# Patient Record
Sex: FEMALE | URBAN - METROPOLITAN AREA
[De-identification: names, ages, dates, MRNs, and addresses within clinical notes are randomized per-mention and may not be internally consistent; named-entity substitution may affect disease eponyms.]

---

## 2018-12-21 ENCOUNTER — APPOINTMENT (RX ONLY)
Dept: URBAN - METROPOLITAN AREA CLINIC 347 | Facility: CLINIC | Age: 33
Setting detail: DERMATOLOGY
End: 2018-12-21

## 2018-12-21 DIAGNOSIS — L50.3 DERMATOGRAPHIC URTICARIA: ICD-10-CM

## 2018-12-21 PROBLEM — L70.0 ACNE VULGARIS: Status: ACTIVE | Noted: 2018-12-21

## 2018-12-21 PROCEDURE — ? TREATMENT REGIMEN

## 2018-12-21 PROCEDURE — ? PRESCRIPTION

## 2018-12-21 PROCEDURE — 99202 OFFICE O/P NEW SF 15 MIN: CPT

## 2018-12-21 PROCEDURE — ? COUNSELING

## 2018-12-21 RX ORDER — TRIAMCINOLONE ACETONIDE 1 MG/G
CREAM TOPICAL
Qty: 1 | Refills: 0 | Status: ERX

## 2018-12-21 ASSESSMENT — LOCATION SIMPLE DESCRIPTION DERM
LOCATION SIMPLE: LEFT THIGH
LOCATION SIMPLE: CHEST
LOCATION SIMPLE: LEFT FOREARM
LOCATION SIMPLE: RIGHT UPPER BACK
LOCATION SIMPLE: LEFT POSTERIOR THIGH

## 2018-12-21 ASSESSMENT — LOCATION DETAILED DESCRIPTION DERM
LOCATION DETAILED: LEFT VENTRAL PROXIMAL FOREARM
LOCATION DETAILED: MIDDLE STERNUM
LOCATION DETAILED: LEFT ANTERIOR PROXIMAL THIGH
LOCATION DETAILED: LEFT DISTAL POSTERIOR THIGH
LOCATION DETAILED: RIGHT INFERIOR MEDIAL UPPER BACK

## 2018-12-21 ASSESSMENT — LOCATION ZONE DERM
LOCATION ZONE: TRUNK
LOCATION ZONE: LEG
LOCATION ZONE: ARM

## 2018-12-21 NOTE — PROCEDURE: TREATMENT REGIMEN
Detail Level: Zone
Otc Regimen: Allegra or Claritin \\nZantac or ranitidine\\nFree and clear detergents\\nNo fabric softeners\\nFree and clear dryer sheets\\nDove, Aveeno, CeraVe, or Eucerin body wash and moisturizer\\nNo suave or VO5 shampoo
Initiate Treatment: Triamcinolone 0.1% cream-apply to itchy areas as needed for itch
Plan: Will consider patch testing if not better

## 2018-12-27 RX ORDER — TRIAMCINOLONE ACETONIDE 1 MG/G
CREAM TOPICAL
Qty: 1 | Refills: 0 | Status: ERX

## 2019-07-03 ENCOUNTER — APPOINTMENT (RX ONLY)
Dept: URBAN - METROPOLITAN AREA CLINIC 347 | Facility: CLINIC | Age: 34
Setting detail: DERMATOLOGY
End: 2019-07-03

## 2019-07-03 DIAGNOSIS — D18.0 HEMANGIOMA: ICD-10-CM

## 2019-07-03 DIAGNOSIS — L81.4 OTHER MELANIN HYPERPIGMENTATION: ICD-10-CM

## 2019-07-03 PROBLEM — D18.01 HEMANGIOMA OF SKIN AND SUBCUTANEOUS TISSUE: Status: ACTIVE | Noted: 2019-07-03

## 2019-07-03 PROBLEM — L29.8 OTHER PRURITUS: Status: ACTIVE | Noted: 2019-07-03

## 2019-07-03 PROCEDURE — 99213 OFFICE O/P EST LOW 20 MIN: CPT

## 2019-07-03 PROCEDURE — ? COUNSELING

## 2019-07-03 ASSESSMENT — LOCATION SIMPLE DESCRIPTION DERM
LOCATION SIMPLE: SUPERIOR FOREHEAD
LOCATION SIMPLE: RIGHT FOREHEAD

## 2019-07-03 ASSESSMENT — LOCATION ZONE DERM: LOCATION ZONE: FACE

## 2019-07-03 ASSESSMENT — LOCATION DETAILED DESCRIPTION DERM
LOCATION DETAILED: SUPERIOR MID FOREHEAD
LOCATION DETAILED: RIGHT SUPERIOR MEDIAL FOREHEAD

## 2019-10-21 ENCOUNTER — APPOINTMENT (RX ONLY)
Dept: URBAN - METROPOLITAN AREA CLINIC 347 | Facility: CLINIC | Age: 34
Setting detail: DERMATOLOGY
End: 2019-10-21

## 2019-10-21 DIAGNOSIS — L30.9 DERMATITIS, UNSPECIFIED: ICD-10-CM

## 2019-10-21 PROBLEM — L85.3 XEROSIS CUTIS: Status: ACTIVE | Noted: 2019-10-21

## 2019-10-21 PROCEDURE — ? COUNSELING

## 2019-10-21 PROCEDURE — 95044 PATCH/APPLICATION TESTS: CPT

## 2019-10-21 PROCEDURE — ? PATCH TESTING

## 2019-10-23 ENCOUNTER — APPOINTMENT (RX ONLY)
Dept: URBAN - METROPOLITAN AREA CLINIC 347 | Facility: CLINIC | Age: 34
Setting detail: DERMATOLOGY
End: 2019-10-23

## 2019-10-23 DIAGNOSIS — L30.9 DERMATITIS, UNSPECIFIED: ICD-10-CM | Status: RESOLVING

## 2019-10-23 PROCEDURE — 99213 OFFICE O/P EST LOW 20 MIN: CPT

## 2019-10-23 PROCEDURE — ? OTHER

## 2019-10-23 PROCEDURE — ? CORE ACDS PATCH TEST READING

## 2019-10-23 PROCEDURE — ? COUNSELING

## 2019-10-23 ASSESSMENT — LOCATION ZONE DERM: LOCATION ZONE: TRUNK

## 2019-10-23 ASSESSMENT — LOCATION SIMPLE DESCRIPTION DERM
LOCATION SIMPLE: RIGHT LOWER BACK
LOCATION SIMPLE: LEFT UPPER BACK

## 2019-10-23 ASSESSMENT — LOCATION DETAILED DESCRIPTION DERM
LOCATION DETAILED: RIGHT SUPERIOR MEDIAL MIDBACK
LOCATION DETAILED: LEFT SUPERIOR LATERAL UPPER BACK

## 2019-10-23 NOTE — PROCEDURE: OTHER
Other (Free Text): Patient can follow up with allergist after testing to see if there is an internal allergy to season change, pets etc.
Detail Level: Zone
Note Text (......Xxx Chief Complaint.): This diagnosis correlates with the

## 2019-10-23 NOTE — PROCEDURE: MIPS QUALITY
Quality 110: Preventive Care And Screening: Influenza Immunization: Influenza immunization was not ordered or administered, reason not given
Quality 130: Documentation Of Current Medications In The Medical Record: Current Medications with Name, Dosage, Frequency, or Route not Documented, Reason not Given
Detail Level: Detailed
Quality 226: Preventive Care And Screening: Tobacco Use: Screening And Cessation Intervention: Patient screened for tobacco use and is an ex/non-smoker

## 2019-10-23 NOTE — PROCEDURE: CORE ACDS PATCH TEST READING
Allergen 65 Reaction: no reaction
Name Of Allergen 34: fragrance mix II
Name Of Allergen 31: hydrocortisone-17-butyrate
Name Of Allergen 3: neomycin sulfate
Name Of Allergen 73: amidoamine
Name Of Allergen 8: paraben mix (B)
Name Of Allergen 46: methyl methacrylate
Name Of Allergen 40: cocamidopropyl betaine
Name Of Allergen 16: black rubber mix
Name Of Allergen 33: bacitracin
Name Of Allergen 1: nickel sulfate hexahydrate
Name Of Allergen 29: imidazolidinyl urea
Name Of Allergen 19: methyldibromo glutaronitrile
Name Of Allergen 62: sodium benzoate
Name Of Allergen 39: polymyxin B sulfate
Name Of Allergen 57: sesquiterpenelactone mix
Name Of Allergen 72: clobetasol-17-propionate
Show Allergen Counseling In The Note?: Yes
Name Of Allergen 26: benzocaine
Name Of Allergen 14: bisphenol A epoxy resin
Name Of Allergen 65: compositae mix
Name Of Allergen 12: cobalt (II) chloride hexahydrate
Name Of Allergen 67: sorbitan sesquioleate
Name Of Allergen 30: budesonide
Name Of Allergen 18: quaternium 15
Name Of Allergen 55: 2-hydroxy-4-methoxybenzophenone
Name Of Allergen 49: dl alpha tocopherol
Name Of Allergen 2: amerchol L101
Detail Level: Detailed
Name Of Allergen 61: benzophenone-4
Name Of Allergen 38: iodopropynyl butylcarbamate
Name Of Allergen 32: 2-mercaptobenzothiazole
Name Of Allergen 4: potassium dichromate
Name Of Allergen 68: 1,3-diphenylguanidine
What Reading Time Point?: 48 hour
Name Of Allergen 52: chlorhexidine digluconate
Name Of Allergen 45: decyl glucoside
Name Of Allergen 21: formaldehyde
Name Of Allergen 78: butylhydroxytoluene
Name Of Allergen 10: balsam of peru
Name Of Allergen 23: 2-bromo-2-nitropropane-1,3-diol
Name Of Allergen 13: [id number]-[id number] formaldehyde resin
Name Of Allergen 76: disperse orange-3
Name Of Allergen 66: ethyleneurea, melamine formaldehyde mix
Name Of Allergen 24: thiuram mix
Name Of Allergen 70: ethyl hexyl glycerol
Name Of Allergen 6: fragrance mix
Name Of Allergen 36: lidocaine-Hcl
Name Of Allergen 77: benzoic acid
Name Of Allergen 44: oleamidopropyl dimethylamine
Name Of Allergen 35: disperse blue mix
Name Of Allergen 74: ethyl cyanoacrylate
Name Of Allergen 69: cetylstearylalcohol
Name Of Allergen 56: tosylamide formaldehyde resin
Name Of Allergen 41: mixed dialkyl thioureas
Name Of Allergen 42: dimethylaminopropylamine
Name Of Allergen 5: DMDM hydantoin
Name Of Allergen 17: methylchloroisothiazolinone/methylisothiazolinone
Name Of Allergen 9: methylisothiazolinone
Name Of Allergen 75: phenoxyethanol
Name Of Allergen 59: 4-chloro-3-cresol
Name Of Allergen 64: cananga odorata
Name Of Allergen 51: tea tree oil, oxidized
Name Of Allergen 53: propolis
Name Of Allergen 79: 2-ethylhexyl-4-methoxycinnamate
Name Of Allergen 80: benzyl alcohol
Name Of Allergen 58: coconut diethanlamide
Name Of Allergen 37: propylene glycol
Name Of Allergen 63: sorbic acid
Name Of Allergen 47: lavadula angustifolia oil (lavender oil)
Name Of Allergen 28: gold sodium thiosulfate
Name Of Allergen 71: triamcinolone acetonide
Name Of Allergen 11: ethylenediamine
Name Of Allergen 48: cinnamic aldehyde
Number Of Patches Read: 80
Name Of Allergen 22: mercapto mix (A)
Name Of Allergen 20: p-phenylenediamine
Name Of Allergen 50: ethyl acrylate
Name Of Allergen 43: 2-hydroxyethyl methacrylate
Name Of Allergen 25: diazolidinyl urea
Name Of Allergen 60: benzalkonium chloride
Name Of Allergen 7: colophony
Name Of Allergen 27: tixocortol-21-pivalate
Name Of Allergen 15: carba mix
Name Of Allergen 54: 4-chloro-3,5-xylenol

## 2019-10-25 ENCOUNTER — APPOINTMENT (RX ONLY)
Dept: URBAN - METROPOLITAN AREA CLINIC 347 | Facility: CLINIC | Age: 34
Setting detail: DERMATOLOGY
End: 2019-10-25

## 2019-10-25 DIAGNOSIS — L30.9 DERMATITIS, UNSPECIFIED: ICD-10-CM

## 2019-10-25 DIAGNOSIS — L50.3 DERMATOGRAPHIC URTICARIA: ICD-10-CM

## 2019-10-25 PROCEDURE — ? COUNSELING

## 2019-10-25 PROCEDURE — ? OTHER

## 2019-10-25 PROCEDURE — ? TREATMENT REGIMEN

## 2019-10-25 PROCEDURE — 99213 OFFICE O/P EST LOW 20 MIN: CPT

## 2019-10-25 PROCEDURE — ? CORE ACDS PATCH TEST READING

## 2019-10-25 ASSESSMENT — LOCATION SIMPLE DESCRIPTION DERM
LOCATION SIMPLE: LEFT UPPER BACK
LOCATION SIMPLE: RIGHT LOWER BACK

## 2019-10-25 ASSESSMENT — LOCATION ZONE DERM: LOCATION ZONE: TRUNK

## 2019-10-25 NOTE — PROCEDURE: CORE ACDS PATCH TEST READING
Allergen 44 Reaction: no reaction
Name Of Allergen 2: amerchol L101
Name Of Allergen 42: dimethylaminopropylamine
Name Of Allergen 24: thiuram mix
Name Of Allergen 58: coconut diethanlamide
Name Of Allergen 40: cocamidopropyl betaine
Name Of Allergen 29: imidazolidinyl urea
Name Of Allergen 28: gold sodium thiosulfate
Name Of Allergen 48: cinnamic aldehyde
Name Of Allergen 71: triamcinolone acetonide
Name Of Allergen 15: carba mix
Name Of Allergen 44: oleamidopropyl dimethylamine
Name Of Allergen 27: tixocortol-21-pivalate
Name Of Allergen 26: benzocaine
Name Of Allergen 47: lavadula angustifolia oil (lavender oil)
Name Of Allergen 52: chlorhexidine digluconate
Name Of Allergen 80: benzyl alcohol
Name Of Allergen 45: decyl glucoside
Name Of Allergen 35: disperse blue mix
Name Of Allergen 51: tea tree oil, oxidized
Name Of Allergen 13: r-ggpa-vwpbxbypbv formaldehyde resin
Name Of Allergen 65: compositae mix
Name Of Allergen 50: ethyl acrylate
Name Of Allergen 60: benzalkonium chloride
Name Of Allergen 36: lidocaine-Hcl
Name Of Allergen 55: 2-hydroxy-4-methoxybenzophenone
Name Of Allergen 64: cananga odorata
Name Of Allergen 61: benzophenone-4
Name Of Allergen 21: formaldehyde
Name Of Allergen 19: methyldibromo glutaronitrile
Name Of Allergen 4: potassium dichromate
Name Of Allergen 11: ethylenediamine
Show Negative Results In The Note?: Yes
Name Of Allergen 12: cobalt (II) chloride hexahydrate
Name Of Allergen 9: methylisothiazolinone
Name Of Allergen 14: bisphenol A epoxy resin
Name Of Allergen 74: ethyl cyanoacrylate
Name Of Allergen 57: sesquiterpenelactone mix
Name Of Allergen 54: 4-chloro-3,5-xylenol
Name Of Allergen 10: balsam of peru
Name Of Allergen 67: sorbitan sesquioleate
Name Of Allergen 68: 1,3-diphenylguanidine
Name Of Allergen 5: DMDM hydantoin
Name Of Allergen 59: 4-chloro-3-cresol
Name Of Allergen 8: paraben mix (B)
Name Of Allergen 6: fragrance mix
Name Of Allergen 20: p-phenylenediamine
Name Of Allergen 46: methyl methacrylate
Name Of Allergen 32: 2-mercaptobenzothiazole
Name Of Allergen 33: bacitracin
Name Of Allergen 16: black rubber mix
Name Of Allergen 56: tosylamide formaldehyde resin
Name Of Allergen 41: mixed dialkyl thioureas
Name Of Allergen 72: clobetasol-17-propionate
Name Of Allergen 73: amidoamine
Name Of Allergen 39: polymyxin B sulfate
Name Of Allergen 43: 2-hydroxyethyl methacrylate
Name Of Allergen 34: fragrance mix II
Name Of Allergen 69: cetylstearylalcohol
Allergen 1 Reaction: 2+
Name Of Allergen 3: neomycin sulfate
Name Of Allergen 79: 2-ethylhexyl-4-methoxycinnamate
Name Of Allergen 25: diazolidinyl urea
Name Of Allergen 38: iodopropynyl butylcarbamate
What Reading Time Point?: 72 hour
Detail Level: Detailed
Number Of Patches Read: 80
Name Of Allergen 1: nickel sulfate hexahydrate
Name Of Allergen 37: propylene glycol
Name Of Allergen 49: dl alpha tocopherol
Name Of Allergen 7: colophony
Name Of Allergen 17: methylchloroisothiazolinone/methylisothiazolinone
Name Of Allergen 53: propolis
Name Of Allergen 70: ethyl hexyl glycerol
Name Of Allergen 18: quaternium 15
Name Of Allergen 22: mercapto mix (A)
Name Of Allergen 75: phenoxyethanol
Name Of Allergen 66: ethyleneurea, melamine formaldehyde mix
Name Of Allergen 76: disperse orange-3
Name Of Allergen 63: sorbic acid
Name Of Allergen 23: 2-bromo-2-nitropropane-1,3-diol
Name Of Allergen 31: hydrocortisone-17-butyrate
Name Of Allergen 62: sodium benzoate
Name Of Allergen 30: budesonide
Name Of Allergen 78: butylhydroxytoluene
Name Of Allergen 77: benzoic acid

## 2019-10-25 NOTE — PROCEDURE: TREATMENT REGIMEN
Otc Regimen: Eucerin Advanced Wash \\nEucerin Eczema Relief moisturizer\\nQuicker cooler showers
Detail Level: Zone
Initiate Treatment: Allegra once daily x 1month

## 2019-10-25 NOTE — PROCEDURE: OTHER
Other (Free Text): Patient can follow up with allergist after testing to see if there is an internal allergy to season change, pets etc.
Note Text (......Xxx Chief Complaint.): This diagnosis correlates with the
Detail Level: Zone

## 2022-06-02 ENCOUNTER — APPOINTMENT (RX ONLY)
Dept: URBAN - METROPOLITAN AREA CLINIC 340 | Facility: CLINIC | Age: 37
Setting detail: DERMATOLOGY
End: 2022-06-02

## 2022-06-02 DIAGNOSIS — D18.0 HEMANGIOMA: ICD-10-CM

## 2022-06-02 DIAGNOSIS — L81.4 OTHER MELANIN HYPERPIGMENTATION: ICD-10-CM

## 2022-06-02 DIAGNOSIS — L82.1 OTHER SEBORRHEIC KERATOSIS: ICD-10-CM

## 2022-06-02 DIAGNOSIS — L72.0 EPIDERMAL CYST: ICD-10-CM

## 2022-06-02 DIAGNOSIS — D22 MELANOCYTIC NEVI: ICD-10-CM

## 2022-06-02 DIAGNOSIS — B35.3 TINEA PEDIS: ICD-10-CM | Status: INADEQUATELY CONTROLLED

## 2022-06-02 PROBLEM — D18.01 HEMANGIOMA OF SKIN AND SUBCUTANEOUS TISSUE: Status: ACTIVE | Noted: 2022-06-02

## 2022-06-02 PROBLEM — D22.5 MELANOCYTIC NEVI OF TRUNK: Status: ACTIVE | Noted: 2022-06-02

## 2022-06-02 PROCEDURE — 99213 OFFICE O/P EST LOW 20 MIN: CPT | Mod: 25

## 2022-06-02 PROCEDURE — ? COUNSELING

## 2022-06-02 PROCEDURE — ? OTC TREATMENT REGIMEN

## 2022-06-02 PROCEDURE — ? PRESCRIPTION MEDICATION MANAGEMENT

## 2022-06-02 PROCEDURE — 10040 EXTRACTION: CPT

## 2022-06-02 PROCEDURE — ? PRESCRIPTION

## 2022-06-02 PROCEDURE — ? ACNE SURGERY

## 2022-06-02 RX ORDER — ECONAZOLE NITRATE 10 MG/G
CREAM TOPICAL BID
Qty: 30 | Refills: 3 | Status: ERX | COMMUNITY
Start: 2022-06-02

## 2022-06-02 RX ADMIN — ECONAZOLE NITRATE: 10 CREAM TOPICAL at 00:00

## 2022-06-02 ASSESSMENT — LOCATION SIMPLE DESCRIPTION DERM
LOCATION SIMPLE: LEFT 5TH TOE
LOCATION SIMPLE: ABDOMEN
LOCATION SIMPLE: LEFT CHEEK

## 2022-06-02 ASSESSMENT — LOCATION ZONE DERM
LOCATION ZONE: TOE
LOCATION ZONE: FACE
LOCATION ZONE: TRUNK

## 2022-06-02 ASSESSMENT — LOCATION DETAILED DESCRIPTION DERM
LOCATION DETAILED: EPIGASTRIC SKIN
LOCATION DETAILED: LEFT MEDIAL 5TH TOE
LOCATION DETAILED: LEFT CENTRAL BUCCAL CHEEK
LOCATION DETAILED: PERIUMBILICAL SKIN

## 2022-06-02 NOTE — HPI: EVALUATION OF SKIN LESION(S)
What Type Of Note Output Would You Prefer (Optional)?: Standard Output
Hpi Title: Evaluation of Skin Lesions
Additional History: Lesion on nose, chin, finger, and toe

## 2022-06-02 NOTE — PROCEDURE: PRESCRIPTION MEDICATION MANAGEMENT
Render In Strict Bullet Format?: No
Initiate Treatment: econazole 1 % topical cream - Apply twice daily to for 1 month
Detail Level: Zone

## 2022-06-02 NOTE — PROCEDURE: COUNSELING
Detail Level: Generalized
Detail Level: Detailed
Patient Specific Counseling (Will Not Stick From Patient To Patient): Patient aware milia extraction may not be covered by insurance

## 2024-08-14 ENCOUNTER — APPOINTMENT (RX ONLY)
Dept: URBAN - METROPOLITAN AREA CLINIC 340 | Facility: CLINIC | Age: 39
Setting detail: DERMATOLOGY
End: 2024-08-14

## 2024-08-14 DIAGNOSIS — D22 MELANOCYTIC NEVI: ICD-10-CM

## 2024-08-14 DIAGNOSIS — L81.4 OTHER MELANIN HYPERPIGMENTATION: ICD-10-CM

## 2024-08-14 DIAGNOSIS — L72.0 EPIDERMAL CYST: ICD-10-CM | Status: INADEQUATELY CONTROLLED

## 2024-08-14 DIAGNOSIS — L82.1 OTHER SEBORRHEIC KERATOSIS: ICD-10-CM

## 2024-08-14 DIAGNOSIS — L70.0 ACNE VULGARIS: ICD-10-CM | Status: INADEQUATELY CONTROLLED

## 2024-08-14 DIAGNOSIS — D18.0 HEMANGIOMA: ICD-10-CM

## 2024-08-14 PROBLEM — D23.62 OTHER BENIGN NEOPLASM OF SKIN OF LEFT UPPER LIMB, INCLUDING SHOULDER: Status: ACTIVE | Noted: 2024-08-14

## 2024-08-14 PROBLEM — D18.01 HEMANGIOMA OF SKIN AND SUBCUTANEOUS TISSUE: Status: ACTIVE | Noted: 2024-08-14

## 2024-08-14 PROBLEM — D22.5 MELANOCYTIC NEVI OF TRUNK: Status: ACTIVE | Noted: 2024-08-14

## 2024-08-14 PROCEDURE — ? OTC TREATMENT REGIMEN

## 2024-08-14 PROCEDURE — ? FULL BODY SKIN EXAM

## 2024-08-14 PROCEDURE — ? COUNSELING

## 2024-08-14 PROCEDURE — ? PRESCRIPTION MEDICATION MANAGEMENT

## 2024-08-14 PROCEDURE — ? PRESCRIPTION

## 2024-08-14 PROCEDURE — 99214 OFFICE O/P EST MOD 30 MIN: CPT

## 2024-08-14 RX ORDER — TRETIONIN 0.25 MG/G
CREAM TOPICAL
Qty: 45 | Refills: 3 | Status: ERX | COMMUNITY
Start: 2024-08-14

## 2024-08-14 RX ADMIN — TRETIONIN: 0.25 CREAM TOPICAL at 00:00

## 2024-08-14 ASSESSMENT — LOCATION SIMPLE DESCRIPTION DERM
LOCATION SIMPLE: LEFT CHEEK
LOCATION SIMPLE: ABDOMEN

## 2024-08-14 ASSESSMENT — LOCATION DETAILED DESCRIPTION DERM
LOCATION DETAILED: LEFT CENTRAL BUCCAL CHEEK
LOCATION DETAILED: PERIUMBILICAL SKIN
LOCATION DETAILED: EPIGASTRIC SKIN
LOCATION DETAILED: LEFT INFERIOR CENTRAL MALAR CHEEK

## 2024-08-14 ASSESSMENT — LOCATION ZONE DERM
LOCATION ZONE: FACE
LOCATION ZONE: TRUNK

## 2024-08-14 NOTE — PROCEDURE: COUNSELING
Detail Level: Generalized
Detail Level: Detailed
Doxycycline Counseling:  Patient counseled regarding possible photosensitivity and increased risk for sunburn.  Patient instructed to avoid sunlight, if possible.  When exposed to sunlight, patients should wear protective clothing, sunglasses, and sunscreen.  The patient was instructed to call the office immediately if the following severe adverse effects occur:  hearing changes, easy bruising/bleeding, severe headache, or vision changes.  The patient verbalized understanding of the proper use and possible adverse effects of doxycycline.  All of the patient's questions and concerns were addressed.
Azithromycin Pregnancy And Lactation Text: This medication is considered safe during pregnancy and is also secreted in breast milk.
High Dose Vitamin A Pregnancy And Lactation Text: High dose vitamin A therapy is contraindicated during pregnancy and breast feeding.
Tetracycline Counseling: Patient counseled regarding possible photosensitivity and increased risk for sunburn.  Patient instructed to avoid sunlight, if possible.  When exposed to sunlight, patients should wear protective clothing, sunglasses, and sunscreen.  The patient was instructed to call the office immediately if the following severe adverse effects occur:  hearing changes, easy bruising/bleeding, severe headache, or vision changes.  The patient verbalized understanding of the proper use and possible adverse effects of tetracycline.  All of the patient's questions and concerns were addressed. Patient understands to avoid pregnancy while on therapy due to potential birth defects.
Winlevi Counseling:  I discussed with the patient the risks of topical clascoterone including but not limited to erythema, scaling, itching, and stinging. Patient voiced their understanding.
Erythromycin Counseling:  I discussed with the patient the risks of erythromycin including but not limited to GI upset, allergic reaction, drug rash, diarrhea, increase in liver enzymes, and yeast infections.
Tazorac Pregnancy And Lactation Text: This medication is not safe during pregnancy. It is unknown if this medication is excreted in breast milk.
Bactrim Pregnancy And Lactation Text: This medication is Pregnancy Category D and is known to cause fetal risk.  It is also excreted in breast milk.
Minocycline Pregnancy And Lactation Text: This medication is Pregnancy Category D and not consider safe during pregnancy. It is also excreted in breast milk.
Include Pregnancy/Lactation Warning?: No
Aklief counseling:  Patient advised to apply a pea-sized amount only at bedtime and wait 30 minutes after washing their face before applying.  If too drying, patient may add a non-comedogenic moisturizer.  The most commonly reported side effects including irritation, redness, scaling, dryness, stinging, burning, itching, and increased risk of sunburn.  The patient verbalized understanding of the proper use and possible adverse effects of retinoids.  All of the patient's questions and concerns were addressed.
Topical Retinoid Pregnancy And Lactation Text: This medication is Pregnancy Category C. It is unknown if this medication is excreted in breast milk.
Topical Clindamycin Counseling: Patient counseled that this medication may cause skin irritation or allergic reactions.  In the event of skin irritation, the patient was advised to reduce the amount of the drug applied or use it less frequently.   The patient verbalized understanding of the proper use and possible adverse effects of clindamycin.  All of the patient's questions and concerns were addressed.
Birth Control Pills Counseling: Birth Control Pill Counseling: I discussed with the patient the potential side effects of OCPs including but not limited to increased risk of stroke, heart attack, thrombophlebitis, deep venous thrombosis, hepatic adenomas, breast changes, GI upset, headaches, and depression.  The patient verbalized understanding of the proper use and possible adverse effects of OCPs. All of the patient's questions and concerns were addressed.
Erythromycin Pregnancy And Lactation Text: This medication is Pregnancy Category B and is considered safe during pregnancy. It is also excreted in breast milk.
Benzoyl Peroxide Pregnancy And Lactation Text: This medication is Pregnancy Category C. It is unknown if benzoyl peroxide is excreted in breast milk.
Dapsone Counseling: I discussed with the patient the risks of dapsone including but not limited to hemolytic anemia, agranulocytosis, rashes, methemoglobinemia, kidney failure, peripheral neuropathy, headaches, GI upset, and liver toxicity.  Patients who start dapsone require monitoring including baseline LFTs and weekly CBCs for the first month, then every month thereafter.  The patient verbalized understanding of the proper use and possible adverse effects of dapsone.  All of the patient's questions and concerns were addressed.
Topical Sulfur Applications Counseling: Topical Sulfur Counseling: Patient counseled that this medication may cause skin irritation or allergic reactions.  In the event of skin irritation, the patient was advised to reduce the amount of the drug applied or use it less frequently.   The patient verbalized understanding of the proper use and possible adverse effects of topical sulfur application.  All of the patient's questions and concerns were addressed.
Azelaic Acid Pregnancy And Lactation Text: This medication is considered safe during pregnancy and breast feeding.
Isotretinoin Pregnancy And Lactation Text: This medication is Pregnancy Category X and is considered extremely dangerous during pregnancy. It is unknown if it is excreted in breast milk.
Spironolactone Counseling: Patient advised regarding risks of diarrhea, abdominal pain, hyperkalemia, birth defects (for female patients), liver toxicity and renal toxicity. The patient may need blood work to monitor liver and kidney function and potassium levels while on therapy. The patient verbalized understanding of the proper use and possible adverse effects of spironolactone.  All of the patient's questions and concerns were addressed.
Topical Sulfur Applications Pregnancy And Lactation Text: This medication is Pregnancy Category C and has an unknown safety profile during pregnancy. It is unknown if this topical medication is excreted in breast milk.
Topical Retinoid counseling:  Patient advised to apply a pea-sized amount only at bedtime and wait 30 minutes after washing their face before applying.  If too drying, patient may add a non-comedogenic moisturizer. The patient verbalized understanding of the proper use and possible adverse effects of retinoids.  All of the patient's questions and concerns were addressed.
Azithromycin Counseling:  I discussed with the patient the risks of azithromycin including but not limited to GI upset, allergic reaction, drug rash, diarrhea, and yeast infections.
Aklief Pregnancy And Lactation Text: It is unknown if this medication is safe to use during pregnancy.  It is unknown if this medication is excreted in breast milk.  Breastfeeding women should use the topical cream on the smallest area of the skin for the shortest time needed while breastfeeding.  Do not apply to nipple and areola.
Tazorac Counseling:  Patient advised that medication is irritating and drying.  Patient may need to apply sparingly and wash off after an hour before eventually leaving it on overnight.  The patient verbalized understanding of the proper use and possible adverse effects of tazorac.  All of the patient's questions and concerns were addressed.
Sarecycline Counseling: Patient advised regarding possible photosensitivity and discoloration of the teeth, skin, lips, tongue and gums.  Patient instructed to avoid sunlight, if possible.  When exposed to sunlight, patients should wear protective clothing, sunglasses, and sunscreen.  The patient was instructed to call the office immediately if the following severe adverse effects occur:  hearing changes, easy bruising/bleeding, severe headache, or vision changes.  The patient verbalized understanding of the proper use and possible adverse effects of sarecycline.  All of the patient's questions and concerns were addressed.
Minocycline Counseling: Patient advised regarding possible photosensitivity and discoloration of the teeth, skin, lips, tongue and gums.  Patient instructed to avoid sunlight, if possible.  When exposed to sunlight, patients should wear protective clothing, sunglasses, and sunscreen.  The patient was instructed to call the office immediately if the following severe adverse effects occur:  hearing changes, easy bruising/bleeding, severe headache, or vision changes.  The patient verbalized understanding of the proper use and possible adverse effects of minocycline.  All of the patient's questions and concerns were addressed.
Bactrim Counseling:  I discussed with the patient the risks of sulfa antibiotics including but not limited to GI upset, allergic reaction, drug rash, diarrhea, dizziness, photosensitivity, and yeast infections.  Rarely, more serious reactions can occur including but not limited to aplastic anemia, agranulocytosis, methemoglobinemia, blood dyscrasias, liver or kidney failure, lung infiltrates or desquamative/blistering drug rashes.
Doxycycline Pregnancy And Lactation Text: This medication is Pregnancy Category D and not consider safe during pregnancy. It is also excreted in breast milk but is considered safe for shorter treatment courses.
Winlevi Pregnancy And Lactation Text: This medication is considered safe during pregnancy and breastfeeding.
Azelaic Acid Counseling: Patient counseled that medicine may cause skin irritation and to avoid applying near the eyes.  In the event of skin irritation, the patient was advised to reduce the amount of the drug applied or use it less frequently.   The patient verbalized understanding of the proper use and possible adverse effects of azelaic acid.  All of the patient's questions and concerns were addressed.
Detail Level: Zone
High Dose Vitamin A Counseling: Side effects reviewed, pt to contact office should one occur.
Spironolactone Pregnancy And Lactation Text: This medication can cause feminization of the male fetus and should be avoided during pregnancy. The active metabolite is also found in breast milk.
Dapsone Pregnancy And Lactation Text: This medication is Pregnancy Category C and is not considered safe during pregnancy or breast feeding.
Isotretinoin Counseling: Patient should get monthly blood tests, not donate blood, not drive at night if vision affected, not share medication, and not undergo elective surgery for 6 months after tx completed. Side effects reviewed, pt to contact office should one occur.
Birth Control Pills Pregnancy And Lactation Text: This medication should be avoided if pregnant and for the first 30 days post-partum.
Topical Clindamycin Pregnancy And Lactation Text: This medication is Pregnancy Category B and is considered safe during pregnancy. It is unknown if it is excreted in breast milk.
Benzoyl Peroxide Counseling: Patient counseled that medicine may cause skin irritation and bleach clothing.  In the event of skin irritation, the patient was advised to reduce the amount of the drug applied or use it less frequently.   The patient verbalized understanding of the proper use and possible adverse effects of benzoyl peroxide.  All of the patient's questions and concerns were addressed.
Detail Level: Simple

## 2024-08-14 NOTE — PROCEDURE: PRESCRIPTION MEDICATION MANAGEMENT
Render In Strict Bullet Format?: No
Initiate Treatment: tretinoin 0.025 % topical cream: Apply a pea size amount to entire face at night after cleansing. Moisturize before if needed.
Detail Level: Zone
Initiate Treatment: Tretinoin 0.025% cream: thin layer to milia nightly after cleansing
Plan: Discussed extracting milia if no significant improvement with tretinoin.